# Patient Record
Sex: MALE | Race: WHITE
[De-identification: names, ages, dates, MRNs, and addresses within clinical notes are randomized per-mention and may not be internally consistent; named-entity substitution may affect disease eponyms.]

---

## 2018-03-10 ENCOUNTER — HOSPITAL ENCOUNTER (EMERGENCY)
Dept: HOSPITAL 95 - ER | Age: 78
Discharge: HOME | End: 2018-03-10
Payer: MEDICARE

## 2018-03-10 VITALS — HEIGHT: 68 IN | BODY MASS INDEX: 25.01 KG/M2 | WEIGHT: 164.99 LBS

## 2018-03-10 DIAGNOSIS — Z79.82: ICD-10-CM

## 2018-03-10 DIAGNOSIS — I48.91: Primary | ICD-10-CM

## 2018-03-10 DIAGNOSIS — I10: ICD-10-CM

## 2018-03-10 DIAGNOSIS — Z88.1: ICD-10-CM

## 2018-03-10 DIAGNOSIS — Z91.041: ICD-10-CM

## 2018-03-10 DIAGNOSIS — Z79.899: ICD-10-CM

## 2018-03-10 DIAGNOSIS — Z87.891: ICD-10-CM

## 2018-03-10 DIAGNOSIS — J44.9: ICD-10-CM

## 2018-03-10 DIAGNOSIS — Z88.8: ICD-10-CM

## 2018-03-10 DIAGNOSIS — Z91.048: ICD-10-CM

## 2018-03-10 DIAGNOSIS — E03.9: ICD-10-CM

## 2018-03-10 LAB
ALBUMIN SERPL BCP-MCNC: 3.2 G/DL (ref 3.4–5)
ALBUMIN/GLOB SERPL: 0.9 {RATIO} (ref 0.8–1.8)
ALT SERPL W P-5'-P-CCNC: 17 U/L (ref 12–78)
ANION GAP SERPL CALCULATED.4IONS-SCNC: 10 MMOL/L (ref 6–16)
AST SERPL W P-5'-P-CCNC: 18 U/L (ref 12–37)
BASOPHILS # BLD AUTO: 0.05 K/MM3 (ref 0–0.23)
BASOPHILS NFR BLD AUTO: 1 % (ref 0–2)
BILIRUB SERPL-MCNC: 0.7 MG/DL (ref 0.1–1)
BUN SERPL-MCNC: 15 MG/DL (ref 8–24)
CALCIUM SERPL-MCNC: 8.3 MG/DL (ref 8.5–10.1)
CHLORIDE SERPL-SCNC: 103 MMOL/L (ref 98–108)
CO2 SERPL-SCNC: 24 MMOL/L (ref 21–32)
CREAT SERPL-MCNC: 1.1 MG/DL (ref 0.6–1.2)
DEPRECATED RDW RBC AUTO: 46.9 FL (ref 35.1–46.3)
EOSINOPHIL # BLD AUTO: 0.09 K/MM3 (ref 0–0.68)
EOSINOPHIL NFR BLD AUTO: 1 % (ref 0–6)
ERYTHROCYTE [DISTWIDTH] IN BLOOD BY AUTOMATED COUNT: 15.7 % (ref 11.7–14.2)
GLOBULIN SER CALC-MCNC: 3.5 G/DL (ref 2.2–4)
GLUCOSE SERPL-MCNC: 101 MG/DL (ref 70–99)
HCT VFR BLD AUTO: 37.3 % (ref 37–53)
HGB BLD-MCNC: 12 G/DL (ref 13.5–17.5)
IMM GRANULOCYTES # BLD AUTO: 0.04 K/MM3 (ref 0–0.1)
IMM GRANULOCYTES NFR BLD AUTO: 0 % (ref 0–1)
LYMPHOCYTES # BLD AUTO: 1.08 K/MM3 (ref 0.84–5.2)
LYMPHOCYTES NFR BLD AUTO: 12 % (ref 21–46)
MCHC RBC AUTO-ENTMCNC: 32.2 G/DL (ref 31.5–36.5)
MCV RBC AUTO: 83 FL (ref 80–100)
MONOCYTES # BLD AUTO: 1.4 K/MM3 (ref 0.16–1.47)
MONOCYTES NFR BLD AUTO: 15 % (ref 4–13)
NEUTROPHILS # BLD AUTO: 6.65 K/MM3 (ref 1.96–9.15)
NEUTROPHILS NFR BLD AUTO: 72 % (ref 41–73)
NRBC # BLD AUTO: 0 K/MM3 (ref 0–0.02)
NRBC BLD AUTO-RTO: 0 /100 WBC (ref 0–0.2)
PLATELET # BLD AUTO: 198 K/MM3 (ref 150–400)
POTASSIUM SERPL-SCNC: 3.7 MMOL/L (ref 3.5–5.5)
PROT SERPL-MCNC: 6.7 G/DL (ref 6.4–8.2)
PROTHROMBIN TIME: 12 SEC (ref 9.7–11.5)
SODIUM SERPL-SCNC: 137 MMOL/L (ref 136–145)
TROPONIN I SERPL-MCNC: <0.015 NG/ML (ref 0–0.04)

## 2018-05-01 ENCOUNTER — HOSPITAL ENCOUNTER (OUTPATIENT)
Dept: HOSPITAL 95 - ORSCSDS | Age: 78
Discharge: HOME | End: 2018-05-01
Attending: INTERNAL MEDICINE
Payer: OTHER GOVERNMENT

## 2018-05-01 VITALS — BODY MASS INDEX: 26.39 KG/M2 | WEIGHT: 168.12 LBS | HEIGHT: 67.01 IN

## 2018-05-01 DIAGNOSIS — Z79.01: ICD-10-CM

## 2018-05-01 DIAGNOSIS — E03.9: ICD-10-CM

## 2018-05-01 DIAGNOSIS — Z79.899: ICD-10-CM

## 2018-05-01 DIAGNOSIS — Z87.891: ICD-10-CM

## 2018-05-01 DIAGNOSIS — I10: ICD-10-CM

## 2018-05-01 DIAGNOSIS — Z80.0: ICD-10-CM

## 2018-05-01 DIAGNOSIS — D50.9: Primary | ICD-10-CM

## 2018-05-01 DIAGNOSIS — K44.9: ICD-10-CM

## 2018-05-01 DIAGNOSIS — M06.9: ICD-10-CM

## 2018-05-01 DIAGNOSIS — K64.8: ICD-10-CM

## 2018-05-01 DIAGNOSIS — J44.9: ICD-10-CM

## 2018-05-01 DIAGNOSIS — I48.0: ICD-10-CM

## 2018-05-01 DIAGNOSIS — Z79.82: ICD-10-CM

## 2018-05-01 DIAGNOSIS — K22.711: ICD-10-CM

## 2018-05-01 DIAGNOSIS — Z83.71: ICD-10-CM

## 2018-05-01 DIAGNOSIS — D12.3: ICD-10-CM

## 2018-05-01 DIAGNOSIS — K57.30: ICD-10-CM

## 2018-05-01 PROCEDURE — 0DB58ZX EXCISION OF ESOPHAGUS, VIA NATURAL OR ARTIFICIAL OPENING ENDOSCOPIC, DIAGNOSTIC: ICD-10-PCS | Performed by: INTERNAL MEDICINE

## 2018-05-01 PROCEDURE — 0DBL8ZX EXCISION OF TRANSVERSE COLON, VIA NATURAL OR ARTIFICIAL OPENING ENDOSCOPIC, DIAGNOSTIC: ICD-10-PCS | Performed by: INTERNAL MEDICINE

## 2018-05-10 ENCOUNTER — HOSPITAL ENCOUNTER (OUTPATIENT)
Dept: HOSPITAL 95 - ORSCSDS | Age: 78
Discharge: HOME | End: 2018-05-10
Attending: INTERNAL MEDICINE
Payer: OTHER GOVERNMENT

## 2018-05-10 DIAGNOSIS — C15.9: ICD-10-CM

## 2018-05-10 DIAGNOSIS — I48.91: ICD-10-CM

## 2018-05-10 DIAGNOSIS — Z87.891: ICD-10-CM

## 2018-05-10 DIAGNOSIS — Z87.11: ICD-10-CM

## 2018-05-10 DIAGNOSIS — Z80.0: ICD-10-CM

## 2018-05-10 DIAGNOSIS — J44.9: ICD-10-CM

## 2018-05-10 DIAGNOSIS — I10: ICD-10-CM

## 2018-05-10 DIAGNOSIS — E78.5: ICD-10-CM

## 2018-05-10 DIAGNOSIS — E03.9: ICD-10-CM

## 2018-05-10 DIAGNOSIS — Z79.899: ICD-10-CM

## 2018-05-10 DIAGNOSIS — Z79.82: ICD-10-CM

## 2018-05-10 DIAGNOSIS — K44.9: ICD-10-CM

## 2018-05-10 DIAGNOSIS — D50.9: ICD-10-CM

## 2018-05-10 DIAGNOSIS — K22.8: Primary | ICD-10-CM

## 2018-05-10 PROCEDURE — 0DB58ZZ EXCISION OF ESOPHAGUS, VIA NATURAL OR ARTIFICIAL OPENING ENDOSCOPIC: ICD-10-PCS | Performed by: INTERNAL MEDICINE

## 2018-09-22 ENCOUNTER — HOSPITAL ENCOUNTER (EMERGENCY)
Dept: HOSPITAL 95 - ER | Age: 78
Discharge: HOME | End: 2018-09-22
Payer: MEDICARE

## 2018-09-22 VITALS — HEIGHT: 66 IN | BODY MASS INDEX: 26.52 KG/M2 | WEIGHT: 164.99 LBS

## 2018-09-22 DIAGNOSIS — Z79.899: ICD-10-CM

## 2018-09-22 DIAGNOSIS — Z88.1: ICD-10-CM

## 2018-09-22 DIAGNOSIS — Z91.041: ICD-10-CM

## 2018-09-22 DIAGNOSIS — Z87.891: ICD-10-CM

## 2018-09-22 DIAGNOSIS — J44.1: Primary | ICD-10-CM

## 2018-09-22 DIAGNOSIS — Z88.8: ICD-10-CM

## 2018-09-22 DIAGNOSIS — Z91.048: ICD-10-CM

## 2018-09-22 DIAGNOSIS — Z79.82: ICD-10-CM

## 2018-09-22 LAB
ALBUMIN SERPL BCP-MCNC: 3.6 G/DL (ref 3.4–5)
ALBUMIN/GLOB SERPL: 1.1 {RATIO} (ref 0.8–1.8)
ALT SERPL W P-5'-P-CCNC: 25 U/L (ref 12–78)
ANION GAP SERPL CALCULATED.4IONS-SCNC: 13 MMOL/L (ref 6–16)
AST SERPL W P-5'-P-CCNC: 19 U/L (ref 12–37)
BASOPHILS # BLD AUTO: 0 K/MM3 (ref 0–0.23)
BASOPHILS NFR BLD AUTO: 0 % (ref 0–2)
BILIRUB SERPL-MCNC: 0.7 MG/DL (ref 0.1–1)
BUN SERPL-MCNC: 11 MG/DL (ref 8–24)
CALCIUM SERPL-MCNC: 7.8 MG/DL (ref 8.5–10.1)
CHLORIDE SERPL-SCNC: 97 MMOL/L (ref 98–108)
CO2 SERPL-SCNC: 22 MMOL/L (ref 21–32)
CREAT SERPL-MCNC: 0.89 MG/DL (ref 0.6–1.2)
DEPRECATED RDW RBC AUTO: 49.9 FL (ref 35.1–46.3)
EOSINOPHIL # BLD AUTO: 0 K/MM3 (ref 0–0.68)
EOSINOPHIL NFR BLD AUTO: 0 % (ref 0–6)
ERYTHROCYTE [DISTWIDTH] IN BLOOD BY AUTOMATED COUNT: 16 % (ref 11.7–14.2)
GLOBULIN SER CALC-MCNC: 3.3 G/DL (ref 2.2–4)
GLUCOSE SERPL-MCNC: 122 MG/DL (ref 70–99)
HCT VFR BLD AUTO: 37.2 % (ref 37–53)
HGB BLD-MCNC: 12.4 G/DL (ref 13.5–17.5)
IMM GRANULOCYTES # BLD AUTO: 0.03 K/MM3 (ref 0–0.1)
IMM GRANULOCYTES NFR BLD AUTO: 0 % (ref 0–1)
LYMPHOCYTES # BLD AUTO: 0.59 K/MM3 (ref 0.84–5.2)
LYMPHOCYTES NFR BLD AUTO: 8 % (ref 21–46)
MCHC RBC AUTO-ENTMCNC: 33.3 G/DL (ref 31.5–36.5)
MCV RBC AUTO: 85 FL (ref 80–100)
MONOCYTES # BLD AUTO: 0.33 K/MM3 (ref 0.16–1.47)
MONOCYTES NFR BLD AUTO: 5 % (ref 4–13)
NEUTROPHILS # BLD AUTO: 6.05 K/MM3 (ref 1.96–9.15)
NEUTROPHILS NFR BLD AUTO: 87 % (ref 41–73)
NRBC # BLD AUTO: 0 K/MM3 (ref 0–0.02)
NRBC BLD AUTO-RTO: 0 /100 WBC (ref 0–0.2)
PLATELET # BLD AUTO: 228 K/MM3 (ref 150–400)
POTASSIUM SERPL-SCNC: 3.4 MMOL/L (ref 3.5–5.5)
PROT SERPL-MCNC: 6.9 G/DL (ref 6.4–8.2)
SODIUM SERPL-SCNC: 132 MMOL/L (ref 136–145)
TROPONIN I SERPL-MCNC: <0.015 NG/ML (ref 0–0.04)

## 2019-08-15 NOTE — NUR
PT WITH LARGE STOOL. TERI CARE COMPLETE.
PT MEDICATED WITH FENTANYL PER EMAR. LIDOCAINE PATCH PLACED.

## 2019-08-15 NOTE — NUR
PT APPEARS TO BE RESTING IN POSITION OF COMFORT.
ASSESSMENT AT CHARTED. CALL LIGHT IN REACH. BEDALARM ACTIVE DUE TO PT RISK FOR
FALLING.

## 2019-08-15 NOTE — NUR
CALL TO DR. RIVERA FOR VBG RESULTS AND UPDATE
PT O2 CURRENTLY ON 8L HIGHFLOW NC. PER DR. RIVERA PT TO BE PCU STATUS TONIGHT
DUE TO O2 DEMAND AND PLANS FOR TRANSFER TO SURGICAL IN THE AM IF IMPROVED.

## 2019-08-15 NOTE — NUR
SHIFT SUMMARY
PT. REMAINS ALERT AND ORIENTED. PT. CURRENTLY ON 6L NC WITH HUMIDIFIED AIR.
PT. VSS T/O SHIFT. PT. BECOMES QUICKLY AGGRESSIVE WHEN ATTEMPTING TO
REPOSITION. PT. HAS PHILIP IN PLACE DRAINING TO GRAVITY DUE TO RETENTION.
REPORT TO ONCOMING RN.

## 2019-08-15 NOTE — NUR
START OF SHIFT:
REPORT FROM CRYSTAL TEJADA. PT RESTING WITH EYES CLOSED AT THAT TIME. VSS. PT
AWAKENED A SHORT TIME LATER AND BEGAN COUGHING WITH A GARBLE SOUNDING COUGH.
PT CALM AND COOPERATIVE AT THAT TIME AND CONVERSED APPROPRIATELY. PT WAS A+O
BUT COULD NOT REMEMBER THAT HE WAS IN THE HOSPITAL. PT, HOWEVER, GREW
INCREASINGLY CONFUSED, AGITATED, AND ANXIOUS T/O THE EVENING NOT RELIEVED WITH
CONSOLING, MEDICATIONS, NOR REDIRECTION. CIWA 25. RESTRAINTS RE-INITIATED AND
HOSPITALIST NOTIFIED WITH NEW ORDERS GIVEN. PT CALMED AFTER ATIVAN
ADMIN 0.5mg X2 AND IS CURRENTLY RESTING QUIETLY WITH BI-PAP ON. SETTINGS:
10/6, RATE 12, FiO2 40%. WILL CONTINUE TO MONITOR.

## 2019-08-15 NOTE — NUR
1340-PT BECAME VERY AGITATED AND COMBATIVE. C/O HIP PAIN. TEARING OFF O2 AND
ALL MONITORS. HITTING AND KICKING STAFF. MED FOR PAIN. SECURITY AND DR. BARRIENTOS
CALLED. ABG DRAWN AND PT PLACED ON BIPAP AND TRANSFERRED TO ICU.

## 2019-08-15 NOTE — NUR
PT NEW ADMIT THIS SHIFT FOR LEFT HIP FX. PT VSS, O2 INC TO 4LNC TO KEEP SATS
>90%. LUNGS COARSE T/O, W/HARSH UNPROD COUGH. PT ALERT, IS FORGETFUL AND
IMPULSIVE AT TIMES, NEEDING FREQ REORIENTING. PAIN MGD W/MEDS NON PHARM
OPTIONS AS PT LORETTA. I/O CATH DONE X1, PT ASSISTED W/URINAL. PT MOVES SELF IN
BED, USES CALL LIGHT AT TIMES, BED ALARM ON FOR SAFETY. PT NPO POST MIDIGHT,
IVF CONT PER ORDERS. PLAN FOR OR TODAY. WILL CONT TO MONITOR UNTIL REP GIVEN
TO ONCOMING RN.

## 2019-08-15 NOTE — NUR
PLACED PT ON BEDPAN PER PT REQUEST FOR BM.
PT PASSED GAS, NO STOOL.
REPOSITION AND PULLED PT UP IN BED.

## 2019-08-15 NOTE — NUR
AGGRESSION
ATTEMPTED TO GET PT OFF OF OLD SHEETS. PT. WAS ROLLED SLOWLY, PT ATTEMPTING TO
SWING AT STAFF. PT. REFUSING TO BE REPOSITONED. PT. STATES "IM GOING TO
FUCKING PUNCH YOU ALL IF YOU TRY AND MOVE ME". PT. STATES "I WANT TO BE LEFT
ALONE AND I WILL START SWINGING". OLD LINENS REMOVED. PT. AGREED TO XRAY AFTER
LONG CONVERSATION. O2 TITRATED DOWN TO 6L. PT ASSISTED TO SIT UP AND EAT
DINNER. BEER ON DINNER TRAY PER DR. DIEGO

## 2019-08-15 NOTE — NUR
PT. MORE ALERT AT THIS TIME.
PT. RESTRAINTS DC'D AT THIS TIME. DR. JIN AT BEDSIDE. PT. FOLLOWING
COMMANDS AND ANSWERING QUESTIONS. VSS.

## 2019-08-15 NOTE — NUR
ARRIVAL TO ICU
PT. ARRIVES WITH  FROM PACU AND IN BILAT WRIST RESTRAINTS. PT.
PULLING AT RESTRAINTS AND ATTEMPING TO GRAB AT STAFF. PT ON BIPAP UPON
ARRIVAL, SETTINGS 10/5, 40%. PT. HAS BANDAGE IN PLACE TO LEFT HIP; CDI. PT
TRANSFERRED TO ICU BED, REMAINS IN BILAT WRIST RESTRAINTS AT THIS TIME. PT.
VSS UPON ARRIVAL. WILL CONTINUE TO MONITOR.

## 2019-08-16 NOTE — NUR
UDN TX STARTED; PATIENT'S RR UP TO 30-36/MIN.; WILL MONITOR.  CONTINUES TO
SLEEP IF NOT DISTURBED.  OXYGEN DOWN TO 2L/MIN NC AROUND 1505 D/T BIOX.
STAYING HIGH 90'S.

## 2019-08-16 NOTE — NUR
PT MEDICATED FOR PAIN. PT REPOSITIONED TO RIGHT SIDE AND STILL YELLS OUT,
"WHEN I SAY OUCH! THAT MEANS IT HURTS!". PT CONTINUES TO TRY TO HIT WHEN MOVED
BUT IS MORE CONSOLEABLE THAN AT START OF SHIFT. PT REMAINS SLIGHTLY
HYPERTENSIVE. WILL PASS ONTO DAY RN.

## 2019-08-16 NOTE — NUR
DR. FOSTER CONTACTED BY CHARGE NURSE, NICOLE ARITA,RN, RE: PATIENTS REOCCURRING
AGGITATION AND HALLUCINATIONS ETC.; GIVEN BY LIBRIUM PO BY RN AND RECEIVED
FENTANYL 50MCG IV EARLIER IN AM AND THEN A DOSE OF ATIVAN 1MG IV.  PATIENT
STARTED TO BECOME INCREASINGLY CONFUSED ETC. FOLLOWING DOSING OF ATIVAN.
ORDER RECEIVED TO CHANGE PATIENT TO ICU STATUS; PRECEDEX MAY BE USED PRN.

## 2019-08-16 NOTE — NUR
PT TOLERATED SLIGHT ROLL TO THE RIGHT. PT HAS BEEN MORE CALM AND HAS BEEN
SLEEPING SINCE ATIVAN ADMIN. PT AWAKENED SLIGHTLY AND AGREED TO BE MOVED. PT
C/O PAIN BUT WAS ABLE TO TOLERATE PILLOW SUPPORT LIFTING LEFT SIDE SLIGHTLY.
HEELS ELEVATED. SCDS IN PLACE. VSS.

## 2019-08-16 NOTE — NUR
SUDDEN ONSET OF AFIB/RVR; ENCOURAGED PATIENT TO CDB; RATE SLOWS DOWN 'S
'S BUT THEN BACK OVER 130'S+.  REPOSITIONED PATIENT AND FENTANYL 50MCG
IV TO HELP WITH ANY PAIN ISSUES; SBP DOWN TO 90'S; MAP 68.  EKG DONE TO EVAL.;
SEE STRIPS AND EKG.

## 2019-08-16 NOTE — NUR
DR. RUSHING HERE; TO ADD IVF'S TO HELP KEEP PATIENT HYDRATED; REDUCED OXYGEN TO
3L/MIN NC; BIOX 100%; PATIENT SLEEPING.  SEE ADJUSTMENTS ON MEDICATIONS.

## 2019-08-16 NOTE — NUR
ASSUMED CARE OF PATIENT; SEE ASSESSMENT CHARTING FOR DETAILS.  LUNGS CLEAR BUT
CONTINUES WITH ONGOING, INTERMITTENT COUGH WHICH PATIENT STATES HE HAS HAD
SINCE CHILDHOOD.  OXYGEN AT 7L/MIN VIA NC (HUMIDIFIED OXYGEN); BIOX. STAYING
HIGH TO MID 90'S; REDUCED DOWN TO 5L/MIN.  MONITOR REMAINS NSR AND VSS.  PHILIP
TO GRAVITY AND DRAINING FAIR AMOUNTS OF SHANITA COLORED URINE.  RN INQUIRED IF
PATIENT HUNGRY AND HE STATED HE COULD EAT A LITTLE.  REPOSITIONED, UPRIGHT, IN
BED AND ASSISTED WITH MEAL SETUP; PATIENT WANTING TO FEED SELF.  SLOW BUT
STEADY TO FEED SELF; DRINKS FLUID BEST WITHOUT USE OF STRAW.  BEER CAN ON
BREAKFAST TRAY; TAKING SIPS.  RESTRAINTS DC'D AT 0745; PATIENT COOPERATIVE AND
NOT TRYING TO CLIMB OOB.

## 2019-08-16 NOTE — NUR
ASSUMED CARE OF PATIENT AT [facility]  FROM BLAKE TARIQ RN.  PATIENT
CONFUSED; IRRITABLE AND ANGRY TOWARDS STAFF.  PATIENT PULLING AT LINES;
REMOVING BIPAP.  PATIENT AFIB RVR AT SHIFT CHANGE; PREVIOUS SHIFT CALLED DR. MATHIS; ORDERS RECIEVED  ML BOLUS FOR SBP IN THE 90S THEN CARDIZEM GTT
ONCE BP IMPROVED AND TO CALL IF HYPTENSION. PATIENT CONVERED TO NSR WITH PAC'S
AT ; CARDIZEM DISCONTINUED; IVF INFUSING PER ORDER.  PATIENT ORIENTED TO
SELF AND ; EYES CLOSED MOST OF THE TIME; FOLLOWS DIRECTIONS AT TIMES;
ATTEMPTS TO HIT STAFF DURING Q2H TURNS; PATIENT S/P HIP PINNING TO LEFT HIP;
DRESSING C/D/I.  PATIENT TAKES PILLS IN APPLESAUCE.  PATIENT HAS HARSH COUGH
THAT HE REPORTS HE HAS HAD FOR A WHILE; L/S CLEAR. CIWA 10; IRRITABILITY RATED
HIGH.  OXYGEN SATURATION ABOVE 90% ON BIPAP 10/ 40% FIO2 OR 5LPM VIA NC;
REPORT BASELINE IS 2LPM VIA NC.  SOFT BILATERAL WRIST RESTRAINTS RENEWED AND
APPLIED BY CHARGE TERRELL HOLCOMB URINARY CATHETER DRAINING SHANITA URINE.
PATIENT CURRENTLY RESTING IN BED; CALL LIGHT IN REACH; BED IN LOWEST
POSISTION; BED ALARM ON; WILL CONTINUE TO MONITOR AND ASSESS UNTIL END OF
SHIFT.

## 2019-08-17 NOTE — NUR
CARE ASSUMED
 
REPORT RECEIVED, CARE ASSUMED FROM TERRELL LOZANO. PT AGITATED AND CONFUSED,
MEDICATED WITH LIBRIUM. UNABLE TO REASON WITH PATIET REGARDING EDUCATION. PT
IS ABLE TO STATE NAME, BIRTHDAY AND PAIN LEVEL AND TALKS ABOUT HIS HIP BEING
BROKEN, BUT THEN STATES, "TAKE ME BACK TO [facility]." AND "WHO IS SITTING NEXT TO
ME." 02 SATURATIONS DROPPING, ATTEMPTED TO PLACE BIPAP AFTER REPEATED
EDUCATION AND PT YELLS "NO" "NO." COUGH CONTINUES, PT REPORTS AS CHRONIC. 02
TITRATED TO MAINTAIN OXYGEN SATURATIONS. SINCE MEDICATING WITH LIBRIUM AND FOR
PAIN, PT HAS CALMED DOWN AND FALLEN ASLEEP WITHIN THE LAST 20 MINUTES. VITALS
STABLE, SEE FLOWSHEET. SEE SHIFT ASSESSMENT. PT ALTERNATING BETWEEN CALLING
OUT FOR NEEDS AND APPROPRIATE USE OF CALL LIGHT.

## 2019-08-17 NOTE — NUR
REASSESSMENT
 
PT HAS REQUIRED INCREASED 02 TO MAINTAIN SATURATIONS, CURRENTLY AT 7 LPM NASAL
CANNULA. PT REFUSING BIPAP. PT REQUESTED COUGH DROP FOR PERSISTENT COUGH.
FOLLOWING COMMANDS AND AGREED TO SUCK ON LOZENGE, BUT IMMEDIATELY SWALLOWED
WHEN PUT IN MOUTH. PROVIDED PT WITH WATER VIA SYRINGE TO WASH DOWN AND PT
TOLERATED WELL. PT CONTINUES TO BE CONFUSED, FORGETING HE IS IN THE HOSPITAL
BUT DOES KNOW HE HAD SURGERY. AGITATION MINIMAL AT THIS TIME. VITALS CONTINUE
TO BE STABLE.

## 2019-08-17 NOTE — NUR
NO ACUTE CHANGES TO REPORT.  PATIENT DROWSY AFTER LIBRIUM.  PHOS 1.9 THIS AM;
WILL PASS ONTO DAYSHIFT.

## 2019-08-17 NOTE — NUR
PATIENT MORE ALERT AND OPENING EYES SPONTANEOUSLY FOR SHORT PERIODS.
AGREEABLE TO EAT LUNCH.  RN FED PATIENT ABOUT 20% OF LUNCH; NO CHOKING;
OCCASIONAL COUGHIN BUT PER PATIENTS' USUAL.  SYRINGE USED TO GIVE FLUIDS.

## 2019-08-17 NOTE — NUR
PATIENT MORE AWAKE AND WANTING TO EAT; ORAL CARE DONE AND DENTURES REPLACED;
BIPAP REMOVED AND PLACED ON 4L/NC.  GIVEN A FEW BITES OF SCRAMBLED EGGS AND
THEN PO MEDS CRUSHED AND MIXED WITH APPLESAUCE; TOLERATED WELL BUT MUST BE
FED.  SIPS OF WATER AND JUICE GIVEN USING SYRINGE; COUGHS WORSEIF STRAW USED
AND SPILLS BADLY WHEN NO LID ON CUP.

## 2019-08-17 NOTE — NUR
SUMMARY:  TOLERATED ABOUT 20% OF DINNER; RN FED PATIENT; SLOW BUT STEADY.
FLUIDS (BEER, WATER, JUICE) GIVEN USING MEDICINE CUP OR 10CC SYRINGE; APPEARS
TO CAUSE LESS COUGHING SPELLS; PATIENT STATES HE HAS HAD THIS COUGH SINCE HE
CAN REMEMBER.  NO STRAWS USED; TEND TO ACCENTUATE COUGHING.  PHILIP DRAINED
350CC OF SHANITA URINE.  CONTINUES WITH NS AT 50CC/HR AND RECEIVED 500CC OF
K+PHOS. (30MMOL).  DR. RUSHING REDUCED DOSE OF LIBRIUM D/T PATIENT REMAINS VERY
SLEEPY/LETHARGIC AND DIFFICULT TO ROUSE.  ANSWERING QUESTIONS, OF RN, WHILE
EATING DINNER; CONVERSATION APPROPRIATE AND AWARE HE HAD SURG. ON L HIP.
CIWA READINGS (6/13/10) T/O SHIFT.  WILL REPORT TO ONCOMING RN.

## 2019-08-17 NOTE — NUR
ASSUMED CARE OF PATIENT; SEE ASSESSMENT CHARTING FOR DETAILS.  PATIENT
SLEEPING; ROUSES TO PERSISTENT VERBAL AND TACTILE STIMULATION; RESPONDS SLOW
TO QUESTIONS AND SPEECH SOFT BUT REASONABLY CLEAR; EYES SHUT MOST OF TIME;
OPENS FOR ONLY A FEW SECONDS WHEN ASKED TO OPEN EYES.  PAIN PERSISTS TO L HIP;
MOST NOTABLE WITH REPOSITIONING.  LUNGS CLEAR; ON BIPAP WITH SETTINGS 10/6;
RATE 12 AND FIO2 35%.; BIOX. MID TO HIGH 90'S.  MONITOR NSR WITH RATE 80'S TO
90'S; NO BOUT OF SVT OR AFIB NOTED TODAY.  PHILIP DRAINING FAIR AMOUNTS OF MED.
YELLOW TO LT SHANITA URINE.  IVF CONTINUES AT 50CC/HR WITH NS.  BILAT. SOFT
WRIST RESTRAINTS IN PLACE TO PREVENT PATIENT FROM PULLING ON LINES OR HURTING
SELF; SWINGS AT STAFF WHEN HE IS REPOSITIONED (INCREASED L HIP PAIN D/T
POST-OP ORIF).

## 2019-08-17 NOTE — NUR
PATIENT HAS SLEPT ABOUT SEVEN HOURS; TURNED EVERY TWO HOURS; PATIENT GIVEN IV
FENTANYL REPORTING PAIN 9/10 IN HIP; GOOD RESULTS.  CIWA 11; LIBRUIM GIVEN IN
APPLESAUCE.  PATIENT HAS BEEN YELLING OUT AT TIMES; MUMBLING.  REPORTS HE
WANTS TO GO HOME.  CONTINUES TO PULL AT LINES AND BIPAP; CLENCHING FIST WHEN
STAFF IN ROOM AND ATTEMPTING TO HIT STAFF DURING TURNING.  300 ML SHANITA
COLORED URINE OUT SO FAR.  PATIENT WENT INTO SVT WHILE THIS RN WAS ON BREAK
TWICE; CHARGE RN NANCY ENCOURAGED PATIENT TO COUGH; PATIENT CURRENTLY IN NSR
W/ PAC'S RATE IN 90'S.  RT REDUCED FIO2 FROM 40 TO 35; PATIENT WEARS 4LPM VIA
NC WHEN TAKING BREAK FROM BIPAP.  NO OTHER ACUTE CHANGE TO REPORT.  WILL
CONTINUE TO MONITOR AND ASSESS UNTIL END OF SHIFT.

## 2019-08-18 NOTE — NUR
HEART RHYTHM
 
PT HAS NOW BEEN IN NORMAL SINUS RHYTHM WITH A RATE IN THE 90'S FOR GREATER
THAN 30 MINUTES. DILTIAZEM HELD.

## 2019-08-18 NOTE — NUR
DR. RUSHING HERE; ORDERED FLUTTER VALVE AND INCENTIVE SPIROMETER TO HELP LOOSEN
MUUCUS AND OPEN AIRWAYS.  ANGELIA SX TO BE MADE AVAILABLE FOR PATIENT TO
SUCTION ORAL CAVITY AS TOLERATED.

## 2019-08-18 NOTE — NUR
REPORT GIVEN TO TRUONG RENDON RN; SHE WILL TAKE OVER CARE OF PATIENT ONCE HE
IS TRANSFERRED OVER TO PCU 14.

## 2019-08-18 NOTE — NUR
ASSUMED CARE:
 
REPORT RECIEVED FROM JON LOZANO TRANSFERRED FROM ICU 14 TO PCU 14. IN RECLINER
CHAIR, RESTING. PT'S RASPY BREATHING CAN BE HEARD OUTSIDE OF ROOM, RHONCHI
HEARD T/O. 4L NC. TELE IN PLACE. NO FURTHER NEEDS OR CONCERNS AT THIS TIME.

## 2019-08-18 NOTE — NUR
TRANSFERRED TO PCU 14 VIA RECLINER CHAIR; OXYGEN AT 4L/MIN VIA NC.  RN, X2,
TRANSFERRED PATIENT WITHOUT INCIDENT.  MEDS., IVF/TUBING, CHART AND BELONGINGS
WITH PATIENT.

## 2019-08-18 NOTE — NUR
SHIFT SUMMARY:
 
PT RESTING IN RECLINER. LETHARGIC, RASPY BREATHING NOTED, 4L NC. SUCTION AT
BEDSIDE FOR PT WHEN COUGHING. NO EVENTS ON TELE SINCE ASSUMPTION OF CARE.
MEDICATED X1 WITH EXTRA DOSE LIBRIUM FOR INCREASED CIWA, PT RESTING NOW, NO
FURTHER EVENTS

## 2019-08-18 NOTE — NUR
SUMMARY
 
SINCE PREVIOUS NOTE, PT HAS CONTINUED TO REQUIRE HIGH FLOW NASAL CANNULA.
REPEATEDLY REFUSES BIPAP. THIS MORNING PT CONVERTED TO AFIB RVR RATE
140'S-150'S. NOTIFIED DR. MATHIS, ONE TIME DOSE OF METOPROLOL GIVEN. SINCE
THEN, PT HAS BEEN CONVERTING IN AND OUT OF AFIB. UPDATED DR. MATHIS, ONE TIME
DOSE FOR DILTIAZEM ORDERED.  AT THIS TIME PT HAS BEEN IN NORMAL SINUS WITH A
RATE OF 90'S FOR APPROXIMATELY FIVE MINUTES, WILL CONTINUE TO MONITOR AND
ASSESS FOR NEED OF ONE TIME DOSE OF DILTIAZEM. DURING PERIOD OF AFIB RVR,
ATTEMPTED TO PLACE BIPAP MASK ON PT'S FACE AS HE DID NOT DISAGREE AT THAT
TIME. HOWEVER, ONCE PLACED PT BEGAN SCREAMING AND STATED, "DO NOT EVER PUT
THAT ON MY FACE, I AM AN  AND I KNOW THAT WILL NOT HELP." ATTEMPTED TO
EDUCATE AND REASSURE PT UNSUCCESSFULLY AS HE ESCALATES QUICKLY WITH ATTEMPTS.
PT HAS CONTINUED TO HAVE ELEVATED CIWA WITH CONFUSION, HEADACHE AND AGITATION
AND HAS BEEN MANAGED WITH LIBRIUM. PT HAS ALLOWED TURNS AND ADL'S TO AN
EXTENT, AS HE AGREES BUT QUICKLY BECOMES AGITATED AND TELLS STAFF TO LEAVE
ROOM. PT HAS MADE NO ATTEMPTS TO HURT STAFF OR SELF. HIP DRESSING REMAINS
C/D/I, CMS IN TACT. PT AVOIDING USE OF THIS LEG DUE TO PAIN. MEDICATED PER
EMAR WITH FENTANYL. PT ALSO CONFUSED AND STATING "I AM GETTING OUT OF BED." AT
THIS POINT PT TOO WEAK TO BE SUCCESSFUL IN GETTING OUT OF BED INDEPENDENTLY,
BUT BED ALARM IN PLACE.

## 2019-08-18 NOTE — NUR
DR. FOSTER HERE; PATIENT TO BE CHANGED TO PCU STATUS.  CONT. WITH BOUTS OF
ATRIAL FIB. RVR; RN WILL GIVE 5MG IV METOPROLOL; PATIENT ALREADY RECEIVED PO
DOSE OF 37.5MG ABOUT 1/2HR AGO.  AFEBRILE AND VSS.  TOLERATED SMALL AMOUNTS
FOR BREAKFAST.  LUNGS WITH INTERMITTENT UPPER AIRWAY RHONCHI; COUGH REMAINS
MOIST AND HARSH; ROBITUSSIN AVAILABLE Q 6 HR PRN.  CEPACOL LOZENGES ALSO
AVAILABLE; BOTH HELPFUL.

## 2019-08-18 NOTE — NUR
ASSUMED CARE OF PATIENT; SEE ASSESSMENT CHARTING FOR DETAILS.  LUNGS
DIMINISHED IN LOWER LOBES; COUGH REMAINS COARSE/HARSH BUT NON-PRODUCTIVE;
CEPACOL LOZENGE GIVEN TO EASE COUGHING IRRITATION; RN STAYED IN ROOM WHILE
PATIENT SUCKED ON LOZENGE; RN THEN USED KLEENEX AND HAD PATIENT SPIT OUT
REMAINDER OF LOZENGE BEFORE LEAVING ROOM.  BATHED AND LINEN CHANGED BY RN'S
AND THEN PATIENT PLACED IN RECLINER CHAIR WITH CEILING LIFT.  FENTANYL 50MCG
IVT PRIOR TO BATH.  OXYGEN AT 4 1/2 TO 5L/MIN.  BIOX LOW 90'S.  AFEBRILE THIS
AM; VSS.  PHILIP DRAINING SMALL AMOUNT OF SHANITA COLORED URINE.  NS INFUSING AT
50ML/HR.  SCD'S REMAIN IN PLACE.

## 2019-08-18 NOTE — NUR
CNA WENT INTO PT'S ROOM AND CAME OUT STATING THAT PT WAS PULLING OFF HIS
OXYGEN AND TOLD HER TO GET HER BOYFRIEND OUT OF HIS ROOM. CALL TO DR FOSTER
DUE TO CIWA INCREASE, SEE DOCUMENTATION. NEW ORDER FOR ONE TIME OF EXTRA
LIBRIUM, STATES PT HAS BAD REACTION TO ATIVAN

## 2019-08-18 NOTE — NUR
ASPIRATION EVENT
Pt sitting in recliner to take PO medication. Pt attempted to swallow FloMax
capsule with small sip of water. Pt immediately began coughing, aspiration
suspected by this RN. This RN called for Charge RN to come into room, Charge
RN at bedside with this RN, attempting to suction, pt swinging at this RN. Pt
suctioned out white, frothy, mucous. Hospitalist (Varsha and Giuseppe) called to
come to bedside and assess pt. O2 saturations 88-90% on 4L NC (previously
95-98%). Pt attempts to inspire with immediate cough reflex. Stat CXR ordered
by KATHIE Maddox. O2 dropping to 70's;  Pt placed on non-rebreather at 15L/min
with o2 saturtionas up to 92% Pt deep suctioned by RT, slight relief. Pt
coughing calmed, placed on [place] NC at 8L with o2 saturations at 92%.
 
New orders recieved by KATHIE Cihn and KATHIE Recinos. Following instructions as
ordered. Pt currently sitting high fowlers in bed, with occasional wet cough.
Will continue to assess and update hospitalists of any changes.

## 2019-08-19 NOTE — NUR
ASSUMED CARE:
 
PT RESTING IN BED, TALKING TO STAFF BUT MUMBLING, SLURRED SPEECH, GARBLED,
DIFFICULT TO UNDERSTAND. RT AT BEDSIDE GIVING BREATHING TX. RASPY RESPIRATIONS
BUT SATURATING MID 90S ON 4L. NSR ON TELE. MEDICATED FOR HIP PAIN. NO FURTHER
NEEDS AT THIS TIME.

## 2019-08-19 NOTE — NUR
ST AT BEDSIDE TO EVALUATE PT, PT AGITATED, YELLING AT STAFF. ST FELT PT NOT
AWAKE ENOUGH FOR EVALUATION. PHYSICAL THERAPY AT BEDSIDE TO EVALUATE PT'S LEFT
HIP DUE TO INCREASED C/O PAIN AFTER TRANSFER FROM CHAIR TO BED LAST NIGHT.
PHYSICAL THERAPY OPTED TO NOT DO PT'S EVAL DUE TO AGITATION BUT DID NOT FEEL
THAT DISPLACEMENT OCCURRED.

## 2019-08-19 NOTE — NUR
PT HAS ONLY VOIDED SMALL AMOUNT SINCE DC OF PHILIP. BLADDER SCAN REVEALED LESS
THAN 100. WILL CONTINUE TO MONITOR

## 2019-08-19 NOTE — NUR
PT'S SATURATION REMAINED IN 80S. ATTEMPTED TO PUT O2 IN PT'S MOUTH AND
SATURATION WOULD IMPROVE BUT PT KEPT REMOVING. DISCUSSED WITH CHARGE RN AND
RT. ATTEMPTED TO PUT CHIN STRAP ON WHICH PT RIPPED OFF. BILATERAL WRIST
RESTRAINTS PLACED. CALL TO DR FOSTER AND PT'S BROTHER LIZ TO UPDATE. DR FOSTER AWARE THAT SPEECH WILL REATTEMPT TOMORROW

## 2019-08-19 NOTE — NUR
CALL TO PT'S BROTHER LIZ TO GIVE UPDATE. BROTHER STATES PT GETS AGITATED
AND CONFUSED AT HOME. BROTHER ALSO STATES PT HAS SMITH'S ESOPHAGUS AND HAS
HAD TO HAVE DILATION DONE AT [facility]. STATES PT LIVES ALONE AND DOES NOT HAVE
CAREGIVERS BUT DOES HAVE HOME HEALTH

## 2019-08-19 NOTE — NUR
DR FOSTER AWARE THAT PT IS CURRENTLY NPO INCLUDING MEDS. TITRATED O2 DOWN TO
3L. DR STATES TO DC PHILIP AND REPLACE WITH CONDOM CATH. PLANS FOR DC DEPEND ON
RECOMMENDATIONS OF THERAPIES

## 2019-08-19 NOTE — NUR
Shift Summary
Pt with aspiration episode this shift, see previous note for details. All
PO medications held this shift per NPO orders, pt is strict NPO until
swallow eval completed.
 
Throughout shift, pt able to be titrated from 8L highflow NC down to 3L.
Pt given IV haldol for withdrawl symptom management per Giuseppe Avelar
orders. Haldol given, pt sleeping but arrousable this shift.
 
VS otherwise stable, pt lethargic, arrousable. No events on tele. Pt uses
ceiling lift for transfers, refuses q2 turns when awake, turned per
orders when sleeping and compliant. Lentz cath patent and draining dark
yellow urine.
 
Pt continues this shift with rhonci lung sounds, coarse and diminshed to
bases. CXR completed stat per orders this shift. No further changes from
initial shift assessment.
 
Will continue to monitor and provide care per orders until day RN assumes
care.

## 2019-08-19 NOTE — NUR
Pt with increasing alertness, able to state name and date of birth correctly,
remembers that he is in the hospital for hip surgery. Pt unable to remember
date, events since admission.
 
Pt bladder scanned at 2358 with 205 shown in bladder. Orders to straight cath
is bladder scan is greater than 350; will conintue to monitor retention.

## 2019-08-19 NOTE — NUR
Assumed care
 
Assumed care of pt at appros 1915. Pt presents in bed, eyes closed,
audible snore/harsh breathing, tachypneic but with o2 saturations in 90's
on 4L via NC. Pt is restrained per orders and d/t pulling at lines and
non compliant with o2 NC. Pt with pulse palp, ROM performed upon
assessment. Pt very lethargic, difficult to arrouse. When aroused, pt is
with AMS, slurred and incomprehensible speech. Lung sounds as documented
in shift assessment, unchanged at this time.
 
Pt had mccoy discontinued per Dr. Miller on day shift, minimal urine
output noted. Varsha NP called and notified of minimal UO since mccoy DC
and orders recieved for Q6 bladder scan and PRN straight cath if bladder
scan finds >350 in bladder.
 
Pt remains strict NPO d/t previous aspiration event. All PO medications
held per orders.
 
Will continue to monitor and provide care per orders.

## 2019-08-19 NOTE — NUR
CALL TO DR FOSTER TO MAKE HER AWARE THAT PT HAS PRODUCED VERY LITTLE URINE
TODAY AND BLADDER SCAN ONLY AS HIGH . DISCUSSED LABS WITH HER AND
DISCUSSED ABG IN AM DUE TO PT CONTINUING TO BE LETHARGIC AND SLURRED SPEECH.
SEE NEW ORDERS

## 2019-08-20 NOTE — NUR
SHIFT SUMMARY
PT REPSONDS TO VERBAL STIMULI FOR MAJORITY OF DAY, AT TIMES ALERT AND ORIENTED
TO SELF/FAMILY. PT AGITATED WITH CARE, RESISTANT TO ORAL CARE AND TURNING TO
CLEAN HIM. PT DENIES PAIN WHEN ASKED. PT SOB WITH EXERTION, ON 4L O2 VIA NC.
BREATHING LABORED, PT USING ACCESSORY MUSCLES AND INTERCOSTAL RETRACTIONS T/O
SHIFT. SPO2 87-95% DURING SHIFT. PT HAS WET, BARKY COUGH. ORAL CARE COMPLETED
Q4 PER ORDERS. SUCTIONED SEVERAL TIMES, SET UP AT BEDSIDE. PT REMAINS NPO. PT
RECEIVING Q6 IV LOPRESSOR, HR 70-90'S, PER TELE ONCE EPISODE WHERE HR TOUCHED
170'S MOMENTARILY. ELEVATED BP NOTED. PT HAD ONE CONTIENT VOID, 2 INCONTINENT
VOIDS DURING SHIFT. WILL CONTINUE TO MONITOR UNITL REPORT GIVEN TO ONCOMING
RN.

## 2019-08-20 NOTE — NUR
HTN NOTED UPON INITIAL VS ASSESSMENT; CATERINA LY CALLED AND ORDERS RECIEVED.
AWAITING MEDICATION FROM PHARMACY AT THIS TIME

## 2019-08-20 NOTE — NUR
AFIB RVR -150'S
Pt asymptomic as he converted from NSR into Afib. Dr Salvador notified and
orders recieved. MD also notifed of pt's no urine output this shift; updated
on bladder scanned amounts. Orders for 500ml bolus.
 
Lungs auscultated before bolus, dim to bases, coarse throughout. After 200 ml,
lungs sound more dim and fine crackles heard to the left lower lobe. RT called
to listen, agreed with change to lung sounds. fluid rate down to 150 per
orders
 
Pt with increased agitation and increased combativeness toward staff when
restraints released for ROM and assessment.
 
Will continue to monitor

## 2019-08-20 NOTE — NUR
Shift Summary
Pt with continued AMS this shift, pt waxing and waning with severity of
confusion. At times, pt is able to converse with staff rather
appropriately, at other times, pt quite combative, aggitated, and
disoriented. Otherwise, VSS and no changes to oxygen demand this shift.
Pt remains on 4L via NC.
 
Lungs sounds are more diminished than at begining of shift. Pt continues
with labored breathing and accessory muscle usage at rest. Hacking, weak,
unproductive cough throughout shift remains.
 
Pt with cardiac events at documented in previous notes. Singular episode
of AFIB RVR has since resolved. Since pt had the Afib event, he has had 3
events of VTACH. MD Salvador notifed and aware. No new orders recieved. Pt
asymptomatic.
 
Pt remains NPO per orders; awaiting speech eval today.
 
Report given to TERRELL Bledsoe who assumes care.

## 2019-08-20 NOTE — NUR
Assumed care of pt at approx 1900; pt in process of straight cath by day RN at
time of arrival. Pt with found to have HTN upon initial assessment by this RN,
orders recieved and medication given per orders and blood pressure resolved to
normotensive. Pt tachypneic, labored breathing and in moderate respiratory
distress upon arrival. Pt quite combative and aggitated toward staff; yelling
and fighting with restraints. Pt given time to calm self, restrains removed
and assessed by this RN for injury, all skin WNL, ROM performed, and
restraints re-placed. Pt turned q2 per orders. Pt with 1 incont void since
straight cath, bladder scanned per orders and found with 173 mls. No events
noted on tele at this time.
 
Pt remains with AMS although able to converse with this RN in some-what
coherant sentences which is an improvement from the night before. Pt continues
to become agitated quickly with staff when staff attempts to provide basic
care for pt. Pt oriented to self and location, but will become confused off
and on. Pt waxing and waning with severity of slurred, incomprehensible
speech. At times, he speaks rather clearly, then will become incomprehensible.
 
See shift assessment for detailed assessment. Will continue to monitor and
provide care per orders.

## 2019-08-21 NOTE — NUR
SHIFT SUMMARY
ASSUMED CARE OF PATIENT THIS AM AT APPROX 0700. PT RESTING IN BED, APPEARS TO
BE SLEEPING. PT RESPONDS TO VERBAL STIMULI, ORIENTED TO SELF. PT IS SLOW TO
RESPOND AND SPEECH IN UNCOMPREHENSIBLE AT TIMES. PT TACHYPENIC, BREATHING
LABORED, CONTINUES TO USE ACCESSORY MUSCLES AND INTERCOSTAL RETRACTION NOTED
T/O SHIFT. PT TITRATED FROM 3L O2 VIA NC TO 2L O2 VIA NC, PER BROTHER LIZ,
2L O2 VIA NC IS PT HOME DOSE. SPO2 90-95% T/O SHIFT. PT HAS S/SX OF PAIN THIS
AFTENROON, MEDICATED x1 WITH FENTNYL WITH POSITIVE RESULTS.
ATTEMPTING TO DO ORAL CARE THIS AM, PT RESISTANT, REFUSING TO ALLOW
STAFF TO COMPLETE. THIS AFTERNOON, PT ALLOWS THIS RN TO DO ORAL CARE AND USED
LEMON MOUTH SWABS AFTER, PT STATES HE LIKE LEMON. PT REFUSING TO WORK WITH
THERAPY SEVERICE DURING SHIFT, HOWEVER, ALLOWED THIS RN AND TECH TO SIT HIM ON
EDGE OF BED AND DANGLE FOR APPROX 5 MINUTES, PT ATTEMTED TO VOID IN URINAL AT
THIS TIME WITH NO OUTPUT. 2 PERSON MAX ASSIST USED TO TRANSFER PT TO EDGE OF
BED. PT REMAINS IN WRIST RESTRAINTS, PULLING AT LINES AND ATTEMPTING TO HIT
STAFF WHEN TAKEN OUT FOR ROM. THIS AFTERNOON, BLADDERSCAN SHOWING >200, NO
DISTENTION OR DISTRESS NOTED, THIS EVEING, LOW ABD DISTENTION NOTED,
BLADDERSCAN OF 1076, NOTIFIED DR FOSTER, NEW ORDER FOR PHILIP CATHETER, PLACED,
PATIENT AND DRAINING, PT TOLERATED WELL. ELEVATE BP NOTED, HR 70-90'S, OTHER
VSS. WILL CONTINUE TO MONITOR UNTIL REPORT GIVEN TO ONCOMING RN.

## 2019-08-21 NOTE — NUR
SHIFT SUMMARY ..VERY CALM AND SLEEPING ALOT AFTER ASSUMING CARE AT 0100.
EXCELLENT MOUTH CARE PROMOTES THICK BROWN YELLOW SECRETIONS FORM BACK OF
THROAT. AND ALOT OF CLEARING OF THE NOISY BREATHING . CROUPY COUGH
OCCASIONALLY ALWAYS EVIDENT. BLADDER DISTENDED AND Q 6 HR BLADDER SCAN , WHEN
DUE , READ WAS > 1000. STRAIGHT CATH RETURN = 1400ML
AND SOME URINE FLOW AROUND MEATUS. VERY CALM FOR CATH AND SEEMS MORE
COMFORTABLE ..ALWAYS 28 RR. SR/ST

## 2019-08-22 NOTE — NUR
PT worked with pt, attempting to follow commands but unable to do much, was
able to stand him, but no further. back to bed with wrist restraints back in
place. call light in reach.

## 2019-08-23 NOTE — NUR
PT IS HALLUCINATING EVERY ONCE IN A WHILE, THOUGHT SOMEONE WAS IN THE ROOM
WITH HIM THAT WASN'T. CONTINUES TO HAVE A POOR APPETITE, NO FURTHER CHANGES
THIS SHIFT. CALL LIGHT IN REACH.

## 2019-08-23 NOTE — NUR
PT LAYING IN BED AWAKE, SPEAKING MUCH MORE CLEAR THAN YESTERDAY, IS ACTUALLY
DIRECTING HIS OWN CARE, BUT IS PUTTING UP BARRIERS, DOES WANT A PAIN PILL FOR
LEFT HIP PAIN, THIS WILL BE GIVEN, HE IS SOMEWHAT COOPERATIVE, IS OUT OF
RESTRAINTS, LUNGS ARE COURSE T/O WITH SOME SCATTERED WHEEZING, IS CURRENTLY ON
3 LITERS O2 WHICH IS HIS BASELINE, HAS A WET JUNKY COUGH, BUT NOT BRINGING
ANYTHING UP YET, HRR, TELE IN PLACE RUNNING SR PER MONITOR, SEE STRIP, TRACE
EDEMA NOTED TO B/L LE, PPP FAINT, CAP REFILL <3SEC, VS STABLE, AFEBRILE, IV
SITES ARE CLEAR AND PATENT, BTX4, ABD ROUND SOFT NONTENDER, VOIDS VIA PHILIP AT
THIS TIME FOR RETENTION, SHANITA URINE, SKIN HAS DRESSING TO LEFT HIP IS C/D/I,
IS SENSITIVE TO BEING TOUCHED, EVERYTHING HURTS,  ARE EQUAL, DPFE IS A
BIT WEAKER ON LEFT, DICK, CALL LIGHT IN REACH.

## 2019-08-23 NOTE — NUR
PT COMPLAINING HE IS UNCOMFORTABLE, HIS BOTTOM IS HURTING, HE RECIEVED A PAIN
PILL AND WE SAT HIM UP TO DANGLE ON SIDE OF BED, HE IS PROFOUNDLY WEAK, UNABLE
TO PULL HIMSELF TO SITTING POSITON WITHOUT HELP, HE INSIST THAT HE CAN DO
EVERYTHING ON HIS OWN AND HE KNOWS WHAT TO DO, ASSISTED HIM TO SIT ON SIDE OF
BED FOR ABOUT 20 MINS, WE ATTEMPTED TO HAVE HIM STAND WITH A GAIT BELT AND
WALKER, BUT WOULD NOT BRING HIS LEFT LEG UNDER HIM, STATES HE KNOWS BETTER
WHAT TO DO, BUT WAS UNABLE TO MOVE LEGS, BECAME MORE WEAK AND NEEDED TO LAY
DOWN, WE RETURNED HIM TO BED AND POSTIONED HIM ON HIS SIDE A BIT. STATES HE IS
COMFORTABLE. SLEEPING SOUNDLY AT THIS TIME. CALL LIGHT IN REACH.

## 2019-08-23 NOTE — NUR
SHIFT SUMMARY
PT A&O X3, OCC DISORIENTED/FORGETFUL REGARDING PLACE OR EVENT. POD#8 L HIP
PINNING; DRESSING CDI; PPPX4. LUNGS WHEEZY/COARSE IN BASES THIS AM; RR 20+
T/O SHIFT. 3L O2 VIA NC;  CONT.-OX IN PLACE, O2 SATURATION 90% AT THIS TIME.
PREVENTATIVE MEPILEX TO COCCYS; HEELS FLOATED BILAT; PT REPOSITIONED T/O
SHIFT. CATHETER DRAINING DARK URINE. PT DENIES SOB, NAUSESA AND CP. TELEMETRY
IN PLACE; SR PER MONITOR TECH.  ASP/MED PRECAUTIONS PER ST/ORDERS. SIDE RAILS
X3 AND BED ALARM FOR SAFETY. CALL LIGHT IN REACH.

## 2019-08-24 NOTE — NUR
SHIFT SUMMARY
PT SLEEPING IN ROOM COMFORTABLY AT THIS TIME. NO ACUTE CHANGES IN STATUS T.O
NIGHT. PT SLEPT WELL IN PERIODS T/O NIGHT. PT CONTINUED TO BE CONFUSED AS TO
WHERE HE WAS. PT ALSO HAD 2 EPISODES OF HALLUNCINATIONS DURING NIGHT. PT WAS
REORIENTED TO TIME AND EVENT, AND WAS REDIRECTABLE. RESP REMAINED EVEN AND
SLIGHTLY TACHY W/ EXERTION ON 3L NC W/ SATS >92%, DIPPING TO 88% W/ COUGHING
EPISODES. PT DENIED PAIN T/O NIGHT, DENIED SOB. PHILIP CATH IN PLACED DRAIING
TO GRAVITY. NS INFUSING IN PIV AT TKO. CALL LIGHT IN REACH, BED ALARM ON FOR
SAFETY.

## 2019-08-25 NOTE — NUR
PT REQUESTING TO CHANGES ROOMS, STATING THAT HIS CURRENT ROOM IS DEPRESSING.
EDUCATED PT THAT WE AWAITING A ROOM ON MEDICAL FLOOR AND ONCE A ROOM BECOMES
AVAILABLE THAT HE WOULD BE TRANSFERED. PT REQUESTING THAT WE WHEEL HIM
DOWNSTAIRS TO THE LOBBY, PT EDUCATED THAT HE IS A PAITIEN AND WE ARE ABLE TO
WHEEL HIM AROUND THE UNIT HOWEVER WE ARE NOT ABLE TO TAKE HIM OFF THE UNIT. PT
STATES HE WANTS TO LEAVE AND GO HOME, WHEN ASKED HOW HE WOULD GET THERE HE
STATES THAT HIS BROTHER WOULD PICK HIM UP AND TAKE HIM HOME. PT EDUCATED THAT
LEAVING PRIOR TO THE DR DISCHARGING HIM WOULD BE CONSIDED LEAVING AGAINST
MEDICAL ADVICE AND RISKS INCLUDING INJURYING, WORSENING OF CONDITION AND
DEATH. PT STATES "I KNOW THE RISKS. CALL MY BROTHER, HE WILL PICK ME UP AND
TAKE ME HOME." THIS RN CALLED PT BROTHER, LIZ, AND EXPLAINED THAT PT IS
WANTING TO BE PICKED UP, THAT HE WOULD BE LEAVING AMA. TRANSFERED HIM TO
PATIENT ROOM TO SPEAK WITH PT. LIZ CALLED THIS RN BACK AFTER SPEAKING WITH
PT, HE WILL NOT BE COMING TO PICK THE PATIENT UP UNTIL DISCHARGED. BROTHER
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
STATES THE PT WOULD NOT BE ABLE TO COME BACK TO THE TRAILER WITHOUT A FULL
TIME CAREGIVER. NOTIFIED DR MAI. WILL CONTINUE TO MONITOR.

## 2019-08-25 NOTE — NUR
SHIFT SUMMARY
PT SLEEPING IN ROOM COMFORTABLY AT THIS TIME. NO ACUTE CHANGES IN STATUS T/O
NIGHT. PT SLEPT WELL MOST OF THE NIGHT. AWOKE EVERAL TIMES AND PULLED O2 OFF
AND DESATED TO 80'S. ONCE OXYGEN WAS REPLCED PT RECOVED VERY QUICKLY AND WENT
BACK TO SLEEP. PT WAS MEDICATED ONCE DURING THE NIGHT FOR PAIN TO HIP. RESP
EVEN UNLABORED ON 2L NC W/ SATS >92%. DENIED CP. BED ALARM ON FOR SAFETY, PT
DID ATTEMPT ONCE TO GET OUT OF BED DURING NIGHT, WAS CONFUSED STATING "I NEED
TO GET UP AND GET DRESSED FOR WORK". PT WAS EASILY REDIRECTABLE BACK TO BED.
CALL LIGHT IN REACH.

## 2019-08-25 NOTE — NUR
SHIFT SUMMARY
PT A&Ox2-3, WITH INTERMITTENT CONFUSION. FORGETFUL AT TIMES. PT UP IN CHAIR
FOR MAJORITY OF SHIFT, 1-2 PERSON ASSIST WITH GB AND WALKER. PT REPORTS PAIN
IN LLE, MEDICATED x1 WITH ULTRAM. PT DENIES SOB, SPO2 >92% ON 2L O2 VIA NC. PT
CONTINUES TO HAVE BARKY COUGH, APPEARS TO BE IMPROVING. PT DENIES NAUSEA, POOR
APPETITE, PT STATES "I WANT REAL FOOD". INITATED BLADDER TRAINING, PHILIP TO BE
REMOVED ONCE PT FINISHS DINNER. VSS. NO OTHER ACUTE CHANGES NOTED DURING
SHIFT. WILL CONTINUE TO MONITOR UNTIL REPORT GIVEN TO ONCOMING RN.

## 2019-08-26 NOTE — NUR
PROVIDER CONTACTED
PT WITH NO VOID SINCE PHILIP REMOVAL @ APPROX 1800 LAST NIGHT. PT DENIES
FEELING THE NEED TO VOID AND COULD NOT VOID WHEN UP TO COMMODE. MOST RECENT
BLADDER SCAN @ 0345  ML. DR MATHIS CONTACTED AND ORDERS RECEIVED TO
REPEAT BLADDER SCAN IN TWO HOURS AND STRAIGHT CATH IF AMOUNT IN BLADDER IS
>500 ML. WILL CONTINUE WITH MONITORING.

## 2019-08-26 NOTE — NUR
SHIFT SUMMARY
PT HAS REMAINED AOX2 THROUGHOUT SHIFT. FREQUENTLY DISORIENTED TO PLACE AND
TIME UPON WAKING, BUT HAS REORIENTED WELL. VSS. HAS BEEN COOPERATIVE WITH
CARE. O2 SATS HAVE REMAINED >90% ON 2L VIA NASAL CANNULA; PT DESATURATES TO
HIGH 80'S OCCASIONALLY WHEN HE REMOVES O2, BUT INCREASES TO >90% WITHIN 15-20
SECONDS ONCE REPLACED IN NARES. PT CONTINUES TO HAVE DECREASED APPETITE AND
STATES THAT THE LIQUIDS ARE "TOO THICK TO DRINK", THOUGH HAS DRANK
APPROXIMATELY 100 ML THIS SHIFT. TOLERATES PILLS CRUSHED IN PUDDING WELL.
MEDICATED ONCE FOR PAIN TO L HIP THAT DECREASED WITH ORDERED MEDICATIONS AND
REPOSITIONING. PT HAS NOT VOIDED SINCE PHILIP REMOVAL LAST EVENING, BUT ORDERS
RECEIVED TO RECHECK BLADDER SCAN @ 0700 AND STRAIGHT CATH IF >500 ML. LAST
BLADDER SCAN SHOWING 357 ML @ 0345 THIS AM. NO OTHER CHANGES NOTED FROM
INITIAL ASSESSMENT. WILL CONTINUE TO MONITOR AND REPORT TO ONCOMING SHIFT RN.
BED IN LOW POSITION, CALL LIGHT IN REACH. BED ALARM SET FOR SAFETY.

## 2019-08-26 NOTE — NUR
SHIFT SUMMARY
PT ALERT TO SELF TO SELF AND SITUATION, BUT DISORIENTED AT TIMES. VS STABLE.
O2 SATS REMAIN ABOVE 90% ON 3L NC. LS COARSE IN THE UPPER LOBES. PT HAS NOT
VOIDED THIS SHIFT. BLADDER SCANS Q6. PT STRAIGHT CATHED THIS AM WITH 3OOML
OUT. PT STRAIGHT CATHED AGAIN AT 1515 WITH 290ML. PT DENIES PAIN AT REST. PLAN
FOR PT TO DISCHARGE TO [facility] THIS EVENING AT 1630. REPORT CALLED TO NURSE
AT [facility]. AWAITING TRANSPORT. PT AWARE.

## 2019-08-26 NOTE — NUR
BLADDER SCAN
PT HAS NOT VOIDED SINCE PHILIP CATHETER REMOVED. BLADDER SCAN DONE  ML
SHOWN IN PATIENT BLADDER. PT STATES THAT HE DOES NOT HAVE THE URGE TO URINATE.

## 2021-01-15 DIAGNOSIS — Z23 NEED FOR VACCINATION: ICD-10-CM
